# Patient Record
Sex: FEMALE | Race: WHITE
[De-identification: names, ages, dates, MRNs, and addresses within clinical notes are randomized per-mention and may not be internally consistent; named-entity substitution may affect disease eponyms.]

---

## 2017-01-11 ENCOUNTER — HOSPITAL ENCOUNTER (EMERGENCY)
Dept: HOSPITAL 59 - ER | Age: 62
Discharge: HOME | End: 2017-01-11
Payer: MEDICARE

## 2017-01-11 DIAGNOSIS — R19.7: ICD-10-CM

## 2017-01-11 DIAGNOSIS — R11.2: ICD-10-CM

## 2017-01-11 DIAGNOSIS — R10.11: Primary | ICD-10-CM

## 2017-01-11 LAB
ALBUMIN SERPL-MCNC: 4.3 GM/DL (ref 3.5–5)
ALP SERPL-CCNC: 53 U/L (ref 38–126)
ALT SERPL-CCNC: 49 U/L (ref 9–52)
ANION GAP SERPL CALC-SCNC: 16.5 MMOL/L (ref 7–16)
APPEARANCE UR: CLEAR
AST SERPL-CCNC: 23 U/L (ref 14–36)
BACTERIA #/AREA URNS HPF: (no result) /[HPF]
BASOPHILS NFR BLD: 0 % (ref 0–6)
BASOPHILS NFR BLD: 0.2 % (ref 0–6)
BILIRUB DIRECT SERPL-MCNC: 0 MG/DL (ref 0–0.3)
BILIRUB SERPL-MCNC: 0.8 MG/DL (ref 0.2–1.3)
BILIRUB UR-MCNC: NEGATIVE MG/DL
BUN SERPL-MCNC: 16 MG/DL (ref 7–17)
CO2 SERPL-SCNC: 19.5 MMOL/L (ref 22–30)
COLOR UR: YELLOW
CREAT SERPL-MCNC: 0.6 MG/DL (ref 0.52–1.04)
EOSINOPHIL NFR BLD: 0 % (ref 0–6)
EOSINOPHIL NFR BLD: 0.8 % (ref 0–6)
EPI CELLS #/AREA URNS HPF: (no result) /[HPF]
ERYTHROCYTE [DISTWIDTH] IN BLOOD BY AUTOMATED COUNT: 13 % (ref 11.5–14.5)
EST GLOMERULAR FILTRATION RATE: > 60 ML/MIN
GLUCOSE SERPL-MCNC: 96 MG/DL (ref 70–110)
GLUCOSE UR STRIP-MCNC: NEGATIVE MG/DL
HCT VFR BLD CALC: 41.8 % (ref 35–47)
HGB BLD-MCNC: 13.9 GM/DL (ref 11.6–16)
KETONES UR QL STRIP: NEGATIVE
LIPASE SERPL-CCNC: 72 U/L (ref 23–300)
LYMPHOCYTES NFR BLD AUTO: 4.9 % (ref 16–45)
LYMPHOCYTES NFR BLD: 5 % (ref 16–45)
MCH RBC QN AUTO: 30.3 PG (ref 27–33)
MCHC RBC AUTO-ENTMCNC: 33.3 G/DL (ref 32–36)
MCV RBC AUTO: 91.1 FL (ref 81–97)
MONOCYTES NFR BLD: 3 % (ref 0–9)
MONOCYTES NFR BLD: 4.6 % (ref 0–9)
NEUTROPHILS NFR BLD AUTO: 90 % (ref 47–80)
NEUTS BAND NFR BLD: 2 % (ref 0–5)
NITRITE UR QL STRIP: NEGATIVE
PLATELET # BLD: 220 K/UL (ref 130–400)
PMV BLD AUTO: 10.4 FL (ref 7.4–10.4)
PROT SERPL-MCNC: 7.1 GM/DL (ref 6.3–8.2)
PROT UR QL STRIP: NEGATIVE
RBC # BLD AUTO: 4.59 M/UL (ref 3.8–5.4)
RBC # UR STRIP: (no result) /UL
RBC #/AREA URNS HPF: (no result) /[HPF]
URINE LEUKOCYTE ESTERASE: NEGATIVE
UROBILINOGEN UR STRIP-ACNC: 0.2 E.U./DL (ref 0.2–1)
WBC # BLD AUTO: 10.5 K/UL (ref 4.2–12.2)
WBC #/AREA URNS HPF: (no result) /[HPF]

## 2017-01-11 PROCEDURE — 80048 BASIC METABOLIC PNL TOTAL CA: CPT

## 2017-01-11 PROCEDURE — 96361 HYDRATE IV INFUSION ADD-ON: CPT

## 2017-01-11 PROCEDURE — 96375 TX/PRO/DX INJ NEW DRUG ADDON: CPT

## 2017-01-11 PROCEDURE — 93005 ELECTROCARDIOGRAM TRACING: CPT

## 2017-01-11 PROCEDURE — 83690 ASSAY OF LIPASE: CPT

## 2017-01-11 PROCEDURE — 93010 ELECTROCARDIOGRAM REPORT: CPT

## 2017-01-11 PROCEDURE — 99284 EMERGENCY DEPT VISIT MOD MDM: CPT

## 2017-01-11 PROCEDURE — 96374 THER/PROPH/DIAG INJ IV PUSH: CPT

## 2017-01-11 PROCEDURE — 74176 CT ABD & PELVIS W/O CONTRAST: CPT

## 2017-01-11 PROCEDURE — 85027 COMPLETE CBC AUTOMATED: CPT

## 2017-01-11 PROCEDURE — 81001 URINALYSIS AUTO W/SCOPE: CPT

## 2017-01-11 PROCEDURE — 80076 HEPATIC FUNCTION PANEL: CPT

## 2017-01-11 NOTE — EMERGENCY DEPARTMENT RECORD
History of Present Illness





- General


Chief Complaint: Abdominal Pain


Stated Complaint: ABD PAIN,VOMITING


Time Seen by Provider: 17 19:15


Source: Patient


Mode of Arrival: Ambulatory


Limitations: No limitations





- History of Present Illness


Initial Comments: 


The patient is here due to a 5 hour hx of upper AP with nausea and single 

episode of vomiting and diarrhea. The pain is sharp and crampy and does radiate 

around to the R flank. She denies any CP, SOB, CARLOS, or any back pain. The 

patient states she has had a single episode of similar pain in the past and was 

told she had a gallbladder attack. Her only abdominal surgery was for a hiatal 

hernia.





MD Complaint: Abdominal pain


Onset/Timin


-: Hour(s)


Location: Epigastric


Radiation: None


Migration to: No migration


Severity: Mild


Quality: Other


Consistency: Constant


Improves With: Nothing


Worsens With: Nothing


Context: Other


Associated Symptoms: Diarrhea, Nausea, Vomiting





- Related Data


LMP (females 10-50): other


 Home Medications











 Medication  Instructions  Recorded  Confirmed  Last Taken


 


Omeprazole [Prilosec] 20 mg PO DAILY 17 Unknown








 Previous Rx's











 Medication  Instructions  Recorded


 


Ondansetron [Zofran Odt] 4 mg SL .Q4-6H PRN #12 tab.rapdis 17


 


Sucralfate [Carafate] 1 gm PO QID #28 tablet 17











 Allergies











Allergy/AdvReac Type Severity Reaction Status Date / Time


 


morphine AdvReac Severe severe Verified 17 19:47





   headache  


 


Penicillins AdvReac Mild mild Verified 17 19:47





   headache  














Travel Screening





- Travel/Exposure Within Last 30 Days


Have you traveled within the last 30 days?: No





- Travel/Exposure Within Last Year


Have you traveled outside the [place] in the last year?: No





- Additonal Travel Details


Have you been exposed to anyone with a communicable illness?: No





- Travel Symptoms


Symptom Screening: None





Review of Systems


Constitutional: Denies: Chills, Fever


Eyes: Denies: Eye discharge


ENT: Denies: Congestion


Respiratory: Denies: Cough, Dyspnea





Past Medical History





- SOCIAL HISTORY


Smoking Status: Former smoker


Alcohol Use: None


Drug Use: None





- RESPIRATORY


Hx Respiratory Disorders: No





- CARDIOVASCULAR


Hx Cardio Disorders: No





- NEURO


Hx Neuro Disorders: No





- GI


Hx GI Disorders: Yes


Comment:: colitis





- 


Hx Genitourinary Disorders: No





- ENDOCRINE


Hx Endocrine Disorders: No





- MUSCULOSKELETAL


Hx Arthritis: Yes


Comment:: psoriatic arthritis





- PSYCH


Hx Psych Problems: No





- HEMATOLOGY/ONCOLOGY


Hx Hematology/Oncology Disorders: No





Family Medical History


Any Significant Family History?: No





Physical Exam





- General


General Appearance: Alert, Oriented x3, Cooperative, No acute distress





- Head


Head exam: Atraumatic, Normocephalic, Normal inspection





- Eye


Eye exam: Normal appearance, PERRL





- Neck


Neck exam: Normal inspection, Full ROM.  negative: Tenderness





- Respiratory


Respiratory exam: Normal lung sounds bilaterally.  negative: Respiratory 

distress





- Cardiovascular


Cardiovascular Exam: Regular rate, Normal rhythm, Normal heart sounds





- GI/Abdominal


GI/Abdominal exam: Soft, Tenderness (There is mild to moderate RUQ and 

epigastric abdominal tenderness.).  negative: Guarding, Rebound, Rigid





- Extremities


Extremities exam: Normal inspection, Full ROM, Normal capillary refill.  

negative: Tenderness





- Neurological


Neurological exam: Normal gait.  negative: Abnormal gait





Course





 Vital Signs











  17





  19:09


 


Temperature 98.6 F


 


Pulse Rate 109 H


 


Respiratory 24





Rate 


 


Blood Pressure 116/82


 


Pulse Ox 97














- Reevaluation(s)


Reevaluation #1: 


The patient is doing much better at this time. She denies any pain or 

discomfort and on exam has very mild epigastric tenderness. She feels much 

better and is resting comfortably. I did explain the lab and CT results with 

the patient and the need for F/U.





17 20:34





Reevaluation #2: 


The patient is doing much better at this time. She feels 100% improved and 

denies any pain or discomfort. On exam her abdomen is very soft and nontender 

in all 4 quads. I explained the lab results to the patient and did explain the 

need for the US. The patient is to return to the ER at 7:15 am for a recheck 

and for an US at 8am.





17 20:58








Medical Decision Making





- Data Complexity


MDM Data: Labs Ordered and/or Reviewed, X-Ray Ordered and/or Reviewed, EKG 

Ordered and/or Reviewed





- Lab Data


Result diagrams: 


 17 19:51





 17 19:51





- EKG Data


-: EKG Interpreted by Me


EKG: No Acute Changes, Normal EKG





- Radiology Data


Radiology results: Report reviewed (ABD CT; No acute changes.)





Disposition


Disposition: Discharge


Clinical Impression: 


Abdominal pain


Qualifiers:


 Abdominal location: right upper quadrant Qualified Code(s): R10.11 - Right 

upper quadrant pain


Disposition: Home, Self-Care


Condition: (1) Good


Instructions:  Abdominal Pain (ED)


Additional Instructions: 


Please only eat clear liquids tonight and nothing past midnight. Please use the 

Zofran and Carafate as directed. Please return to the ER at 7:15 am for recheck 

and for an 8am US.


Prescriptions: 


Sucralfate [Carafate] 1 gm PO QID #28 tablet


Ondansetron [Zofran Odt] 4 mg SL .Q4-6H PRN #12 tab.rapdis


 PRN Reason: Nausea


Forms:  Patient Portal Access


Time of Disposition: 21:01

## 2017-01-13 NOTE — CT SCAN REPORT
EXAM:  ABDOMEN AND PELVIS CT WITHOUT IV CONTRAST 



HISTORY:  ACUTE RIGHT UPPER QUADRANT ABDOMINAL PAIN RADIATING TO THE POSTERIOR 
RIGHT FLANK.  



TECHNIQUE:  Contiguous axial images from the lung bases to the symphysis pubis 
were obtained without IV contrast.  



Comparison:  Abdomen ultrasound 3/11/13.  



FINDINGS:  The lung bases are clear.  Small to moderate sized hiatal hernia.  



Evaluation of the solid abdominal visceral organs is compromised due to lack of 
IV contrast, however, the liver, spleen, adrenals, pancreas, and gallbladder 
are normal.  No renal calculi, hydronephrosis, or perinephric fat stranding.  
The urinary bladder is decompressed with no adjacent fat stranding.  



The visualized loops of small and large bowel are of normal caliber with no 
bowel wall thickening.  Normal appendix.                



No free intraperitoneal fluid or adenopathy.  Mild aortic calcification.  No 
lytic or blastic osseous lesion.  



IMPRESSION:  

1.  NO ACUTE PROCESS OF THE ABDOMEN OR PELVIS.  



2.  SMALL TO MODERATE SIZED HIATAL HERNIA.  



3.  MILD AORTIC CALCIFICATION.  



JOB NUMBER:  [id number]
[facility]

## 2018-01-09 ENCOUNTER — HOSPITAL ENCOUNTER (EMERGENCY)
Dept: HOSPITAL 59 - ER | Age: 63
Discharge: HOME | End: 2018-01-09
Payer: MEDICARE
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
## 2018-01-09 DIAGNOSIS — S61.412A: Primary | ICD-10-CM
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
## 2018-01-09 PROCEDURE — 12001 RPR S/N/AX/GEN/TRNK 2.5CM/<: CPT

## 2018-01-09 PROCEDURE — 99283 EMERGENCY DEPT VISIT LOW MDM: CPT

## 2018-01-09 PROCEDURE — 96372 THER/PROPH/DIAG INJ SC/IM: CPT

## 2018-01-09 PROCEDURE — 90715 TDAP VACCINE 7 YRS/> IM: CPT

## 2018-01-09 NOTE — EMERGENCY DEPARTMENT RECORD
History of Present Illness





- General


Chief Complaint: Animal Bite


Stated Complaint: DOG BITE


Time Seen by Provider: 18 10:58


Source: Patient


Mode of Arrival: Ambulatory


Limitations: No limitations





- History of Present Illness


Initial Comments: 


The patient was bit over the dorsal L hand an hour ago by one of her dogs. The 

dog's rabies shots are UTD. She denies any numbness, tingling or weakness and 

her Td is probably NOT UTD. She also states she is not allergic to PCN and can 

take Amox with no problems.





MD Complaint: Animal bite


Onset/Timin


-: Hour(s)


Animal: Dog


Description: Household pet


Mechanism: Bite


Pain Description: Sharp


Severity scale (1-10): 6


Context: Animals fighting, Playing with animal, Unprovoked


Associated Symptoms: Bleeding


Treatments Prior to Arrival: Pressure





- Related Data


Patient Tetanus UTD (within 5 yrs): No


 Home Medications











 Medication  Instructions  Recorded  Confirmed  Last Taken


 


Adalimumab [Humira] 20 mg SQ WEEKLY 18 Unknown


 


Aspirin [Aspir-Low] 81 mg PO DAILY 18 Unknown


 


Hydrocodone/Acetaminophen [Norco 1 tab PO Q6H PRN 18 10:

00





10mg/325mg]    








 Previous Rx's











 Medication  Instructions  Recorded


 


Amoxicillin/Potassium Clav 1 tab PO BID #14 tablet 18





[Augmentin 875Mg/125Mg]  











 Allergies











Allergy/AdvReac Type Severity Reaction Status Date / Time


 


morphine AdvReac Severe severe Verified 17 19:47





   headache  


 


Penicillins AdvReac Mild mild Verified 17 19:47





   headache  














Travel Screening





- Travel/Exposure Within Last 30 Days


Have you traveled within the last 30 days?: No





Review of Systems


Constitutional: Denies: Chills, Fever





Past Medical History





- SOCIAL HISTORY


Smoking Status: Former smoker


Alcohol Use: None


Drug Use: None





- RESPIRATORY


Hx Respiratory Disorders: No





- CARDIOVASCULAR


Hx Cardio Disorders: No





- NEURO


Hx Neuro Disorders: No





- GI


Hx GI Disorders: Yes


Comment:: colitis





- 


Hx Genitourinary Disorders: No





- ENDOCRINE


Hx Endocrine Disorders: No





- MUSCULOSKELETAL


Hx Musculoskeletal Disorders: Yes


Hx Arthritis: Yes


Comment:: psoriatic arthritis





- PSYCH


Hx Psych Problems: No





- HEMATOLOGY/ONCOLOGY


Hx Hematology/Oncology Disorders: No





Family Medical History


Any Significant Family History?: No





Physical Exam





- General


General Appearance: Alert, Cooperative, No acute distress





- Head


Head exam: Atraumatic, Normocephalic, Normal inspection





- Eye


Eye exam: Normal appearance, PERRL





- Extremities


Extremities exam: Full ROM (There is normal tendon function with full hand and 

finger extension all 4 fingers.), Normal capillary refill.  negative: Normal 

inspection (There is a 2.2 cm lac to the mid dorsal L hand. There is very mild 

tenderness surrounding the wound. )


Image of Hand: 


  __________________________














  __________________________





 1 - 2.2 cm lac.








Course





 Vital Signs











  18





  10:43


 


Temperature 98.1 F


 


Pulse Rate 74


 


Respiratory 16





Rate 


 


Blood Pressure 135/87


 


Pulse Ox 97














- Reevaluation(s)


Reevaluation #1: 


Procedure note: The L hand was anesth. with 1.5 cc's Lido 1% with Epi. The 

wound was prepped with betadine and cleansed with sterile saline. The wound was 

explored and did not involve the tendon. The wound then was loosely closed with 

3 4.0 nylon sutures.


18 11:30








Disposition


Disposition: Discharge


Clinical Impression: 


Hand laceration


Qualifiers:


 Encounter type: initial encounter Foreign body presence: without foreign body 

Laterality: left Qualified Code(s): S61.412A - Laceration without foreign body 

of left hand, initial encounter





Disposition: Home, Self-Care


Condition: (2) Stable


Instructions:  Animal Bite (ED)


Additional Instructions: 


Please keep the hand dry for 2 days and take your home pain medicines. Please 

take Augmentin as directed. Watch for signs of infection and have the sutures 

removed in 10 days.


Prescriptions: 


Amoxicillin/Potassium Clav [Augmentin 875Mg/125Mg] 1 tab PO BID #14 tablet


Forms:  Patient Portal Access


Time of Disposition: 11:33





Quality





- Quality Measures


Quality Measures: N/A





- Blood Pressure Screening


View Details: Yes


Does Patient Have Any of the Following: No


Blood Pressure Classification: Pre-Hypertensive BP Reading


Systolic Measurement: 135


Diastolic Measurement: 87


Screening for High Blood Pressure: < Pre-Hypertensive BP, F/U Documented > [

]


Pre-Hypertensive Follow-up Interventions: Referral to alternative/primary care 

provider.

## 2018-01-18 ENCOUNTER — HOSPITAL ENCOUNTER (EMERGENCY)
Dept: HOSPITAL 59 - ER | Age: 63
Discharge: HOME | End: 2018-01-18
Payer: MEDICARE

## 2018-01-18 DIAGNOSIS — Z48.02: Primary | ICD-10-CM

## 2018-01-18 NOTE — EMERGENCY DEPARTMENT RECORD
History of Present Illness





- General


Chief Complaint: Suture removal


Stated Complaint: STICH REMOVAL


Time Seen by Provider: 01/18/18 14:08


Source: Patient


Mode of arrival: Ambulatory


Limitations: No limitations





- History of Present Illness


Initial Comments: 


The patient is here for suture removal. She denies any new problems or issues.





MD Complaint: Suture/staple removal





- Related Data


 Previous Rx's











 Medication  Instructions  Recorded


 


Amoxicillin/Potassium Clav 1 tab PO BID #14 tablet 01/09/18





[Augmentin 875Mg/125Mg]  











 Allergies











Allergy/AdvReac Type Severity Reaction Status Date / Time


 


morphine AdvReac Severe severe Verified 01/11/17 19:47





   headache  


 


Penicillins AdvReac Mild mild Verified 01/11/17 19:47





   headache  














Past Medical History





- SOCIAL HISTORY


Smoking Status: Former smoker


Drug Use: None





- RESPIRATORY


Hx Respiratory Disorders: No





- CARDIOVASCULAR


Hx Cardio Disorders: No





- NEURO


Hx Neuro Disorders: No





- GI


Hx GI Disorders: Yes


Comment:: colitis





- 


Hx Genitourinary Disorders: No





- ENDOCRINE


Hx Endocrine Disorders: No





- MUSCULOSKELETAL


Hx Musculoskeletal Disorders: Yes


Hx Arthritis: Yes


Comment:: psoriatic arthritis





- PSYCH


Hx Psych Problems: No





- HEMATOLOGY/ONCOLOGY


Hx Hematology/Oncology Disorders: No





Physical Exam





- General


General Appearance: Alert, Oriented x3, Cooperative, No acute distress





- Extremities


Extremities exam: Full ROM.  negative: Normal inspection (The dog bite lac is 

very well healed. 3 sutures were removed with no difficulty.), Tenderness





Disposition


Disposition: Discharge


Clinical Impression: 


 Visit for suture removal





Disposition: Home, Self-Care


Condition: (2) Stable


Instructions:  Stitches Removal (ED)


Additional Instructions: 


Return to the ER for any problems.


Forms:  Patient Portal Access


Time of Disposition: 14:14





Quality





- Quality Measures


Quality Measures: N/A





- Blood Pressure Screening


View Details: Yes


Does Patient Have Any of the Following: No


Blood Pressure Classification: Normal BP Reading


Systolic Measurement: 111


Diastolic Measurement: 78


Screening for High Blood Pressure: < Normal BP, F/U Not Required > []

## 2018-02-05 ENCOUNTER — HOSPITAL ENCOUNTER (OUTPATIENT)
Dept: HOSPITAL 59 - HOP | Age: 63
Discharge: HOME | End: 2018-02-05
Attending: INTERNAL MEDICINE
Payer: MEDICARE

## 2018-02-05 DIAGNOSIS — K29.70: ICD-10-CM

## 2018-02-05 DIAGNOSIS — R12: ICD-10-CM

## 2018-02-05 DIAGNOSIS — K44.9: ICD-10-CM

## 2018-02-05 DIAGNOSIS — R10.13: Primary | ICD-10-CM

## 2018-02-05 DIAGNOSIS — M19.90: ICD-10-CM

## 2018-02-07 NOTE — OPERATIVE NOTE
DATE OF SURGERY:   02/05/2018 



Surgeon:  Artemio Schmidt DO 



Referring physician:   Dr. Brooke Charles



OPERATION:  ESOPHAGOGASTRODUODENOSCOPY WITH MULTIPLE BIOPSIES



INDICATION:   Recent episode of "intestinal virus" per patient.  She had excessive vomiting and 
since that time has experienced epigastric pain and some mild pyrosis.  



She had a hiatal hernia in the past, but had fundoplication completed in 2011 by Dr. Kumar and had 
done well, up until recently.  Upper endoscopy was performed at this time for further evaluation.  



Anesthesia:  Intravenous sedation was administered by the Department of Anesthesiology and included 
Diprivan titrated to effect.  



PROCEDURE:  Following informed consent from this alert individual, including a discussion of the 
risks and benefits of the procedure and opportunity for the patient to ask questions, the patient 
was in the left lateral decubitus position.  The Olympus  video endoscope was inserted in the 
esophagus without resistance.  The proximal esophagus had a normal appearance with normal folds and 
distensibility.  The mid-esophagus likewise was free from mucosal changes.  The squamocolumnar 
junction was well defined and it appeared unremarkable.  However, there was a 2 cm hiatal hernia 
noted extending through the previous fundoplication.  Within the hernia sac there was some retained 
suture noted.  



The subdiaphragmatic stomach was entered and found to have erythema with erosive changes in the 
antrum.  The pylorus is patent, duodenal bulb, cecum, and descending duodenum were then examined in 
a serial fashion and found to be normal.  



The endoscope was then drawn back into the body of the stomach, retroflexion accomplished following 
air insufflation again revealed a fundoplication, however, there was the small 2 to 2.5 cm hiatal 
hernia noted above the wrap.  



The endoscope was then straightened.  Biopsies were taken from the stomach to assess for 
helicobacter pylori and check histology. 



The endoscope was then withdrawn back through a normal appearing esophagus and removed from the 
patient.  



She tolerated the procedure well and was returned to the recovery area in stable condition.  



IMPRESSION:  

1.  Previous fundoplication with hiatal hernia extending through the wrap for approximately 2 to 2.5 
cm.  There was some retained suture within the hernia sac itself. 

2.  Mildly erosive antral gastritis.  Biopsies from the stomach were obtained. 



RECOMMENDATIONS:  The patient could be suffering with post viral gastropathy, however, in light of 
the inflammation noted in her stomach, I will start her on omeprazole 20 mg each morning _____ the 
patient may be forthcoming pending results of the biopsy obtained today.  



There is also perhaps an option of redoing the fundoplication, but I am not sure that would be of 
significant clinical benefit at this point.  If she does not respond to omeprazole, she can follow 
up with Dr. Kumar as well.  



I would like her to follow up in the GI Clinic in the next 2 to 3 months.  



As always, thank you for allowing me to participate in the care of your patient. CC: Dr. Dudley Schmidt, DO [facility]

## 2018-05-26 ENCOUNTER — HOSPITAL ENCOUNTER (EMERGENCY)
Dept: HOSPITAL 59 - ER | Age: 63
Discharge: HOME | End: 2018-05-26
Payer: MEDICARE

## 2018-05-26 DIAGNOSIS — K08.89: Primary | ICD-10-CM

## 2018-05-26 PROCEDURE — 99282 EMERGENCY DEPT VISIT SF MDM: CPT

## 2018-05-26 NOTE — EMERGENCY DEPARTMENT RECORD
History of Present Illness





- General


Chief complaint: Dental


Stated complaint: DENTAL PAIN


Time Seen by Provider: 18 20:48


Source: Patient


Mode of Arrival: Ambulatory





- History of Present Illness


Initial comments: 





Tooth pain began in the middle of last night in her left upper molar. No known 

trauma, but she thinks she grinds her teeth and it may have cracked.  No fevers

, chills, swelling  or drainage. She has psoriatic arthritis for which she 

takes norco but it hasn't helped her tonight.


Onset/Timin


-: Days(s)


Severity: Mild





- Related Data


 Previous Rx's











 Medication  Instructions  Recorded


 


Hydrocodone/APAP 5/325Mg [Norco 1 each PO Q6H #7 tab 18





5Mg/325Mg]  


 


Penicillin V Potassium 500 mg PO QID #39 tablet 18











 Allergies











Allergy/AdvReac Type Severity Reaction Status Date / Time


 


morphine AdvReac Severe severe Verified 17 19:47





   headache  


 


Penicillins AdvReac Mild mild Verified 17 19:47





   headache  














Travel Screening





- Travel/Exposure Within Last 30 Days


Have you traveled within the last 30 days?: No





- Travel/Exposure Within Last Year


Have you traveled outside the [place] in the last year?: No





- Additonal Travel Details


Have you been exposed to anyone with a communicable illness?: No





- Travel Symptoms


Symptom Screening: None





Review of Systems


Reviewed: No additional complaints except as noted below


Constitutional: Reports: As per HPI.  Denies: Chills, Fever, Malaise, Night 

sweats, Weakness, Weight change


Eyes: Reports: As per HPI.  Denies: Eye discharge, Eye pain, Photophobia, 

Vision change


ENT: Reports: As per HPI.  Denies: Congestion, Dental pain, Ear pain, Epistaxis

, Hearing loss, Throat pain


Respiratory: Reports: As per HPI.  Denies: Cough, Dyspnea, Hemoptysis, Stridor, 

Wheezes


Cardiovascular: Reports: As per HPI.  Denies: Arrhythmia, Chest pain, Dyspnea 

on exertion, Edema, Murmurs, Orthopnea, Palpitations, Paroxysmal nocturnal 

dyspnea, Rheumatic Fever, Syncope


Endocrine: Reports: As per HPI.  Denies: Fatigue, Heat or cold intolerance, 

Polydipsia, Polyuria


Gastrointestinal: Reports: As per HPI.  Denies: Abdominal pain, Constipation, 

Diarrhea, Hematemesis, Hematochezia, Melena, Nausea, Vomiting


Genitourinary: Reports: As per HPI.  Denies: Abnormal menses, Discharge, 

Dyspareunia, Dysuria, Frequency, Hematuria, Incontinence, Retention, Urgency


Musculoskeletal: Reports: As per HPI.  Denies: Arthralgia, Back pain, Gout, 

Joint swelling, Myalgia, Neck pain


Skin: Reports: As per HPI.  Denies: Bruising, Change in color, Change in hair/

nails, Lesions, Pruritus, Rash


Neurological: Reports: As per HPI.  Denies: Abnormal gait, Confusion, Headache, 

Numbness, Paresthesias, Seizure, Tingling, Tremors, Vertigo, Weakness


Psychiatric: Reports: As per HPI.  Denies: Anxiety, Auditory hallucinations, 

Depression, Homicidal thoughts, Suicidal thoughts, Visual hallucinations


Hematological/Lymphatic: Reports: As per HPI.  Denies: Anemia, Blood Clots, 

Easy bleeding, Easy bruising, Swollen glands





Past Medical History





- SOCIAL HISTORY


Smoking Status: Former smoker


Alcohol Use: None


Drug Use: None





- RESPIRATORY


Hx Respiratory Disorders: No





- CARDIOVASCULAR


Hx Cardio Disorders: Yes


Hx Irregular Heartbeat: Yes (AFib r/t medications)





- NEURO


Hx Neuro Disorders: No





- GI


Hx GI Disorders: Yes


Hx Abdominal Pain: Yes (upper gastric)


Hx Reflux: Yes


Hx Hiatal Hernia: Yes


Hx Nausea/Vomiting: Yes


Comment:: colitis





- 


Hx Genitourinary Disorders: No





- ENDOCRINE


Hx Endocrine Disorders: No





- MUSCULOSKELETAL


Hx Musculoskeletal Disorders: Yes


Hx Arthritis: Yes


Comment:: psoriatic arthritis





- PSYCH


Hx Psych Problems: No





- HEMATOLOGY/ONCOLOGY


Hx Hematology/Oncology Disorders: No





Family Medical History


Any Significant Family History?: No





Physical Exam





- General


General Appearance: Alert, Oriented x3, Cooperative, No acute distress





- Head


Head exam: Normal inspection





- Eye


Eye exam: Normal appearance, PERRL, EOMI


Pupils: Normal accommodation





- ENT


ENT exam: Normal exam, Mucous membranes moist, Normal external ear exam, Normal 

orophraynx, TM's normal bilaterally


Ear exam: Normal external inspection.  negative: External canal tenderness


Nasal Exam: Normal inspection.  negative: Discharge, Sinus tenderness


Mouth exam: Normal external inspection, Tongue normal


Teeth exam: Normal inspection, Dental tenderness # (#16 tender to light 

palpation).  negative: Dental caries


Throat exam: Normal inspection.  negative: Tonsillar erythema, Tonsillar exudate





- Neck


Neck exam: Normal inspection, Full ROM.  negative: Lymphadenopathy, Meningismus

, Tenderness





- Respiratory


Respiratory exam: Normal lung sounds bilaterally.  negative: Respiratory 

distress





- Cardiovascular


Cardiovascular Exam: Regular rate, Normal rhythm, Normal heart sounds





- GI/Abdominal


GI/Abdominal exam: Soft, Normal bowel sounds.  negative: Tenderness





- Rectal


Rectal exam: Deferred





- 


 exam: Deferred





- Extremities


Extremities exam: Normal inspection, Full ROM, Normal capillary refill.  

negative: Tenderness





- Back


Back exam: Reports: Normal inspection, Full ROM.  Denies: Muscle spasm, Rash 

noted, Tenderness





- Neurological


Neurological exam: Alert, Normal gait, Oriented X3, Reflexes normal





- Psychiatric


Psychiatric exam: Normal affect, Normal mood





- Skin


Skin exam: Dry, Intact, Normal color, Warm





Course





 Vital Signs











  18





  20:36


 


Temperature 98.1 F


 


Pulse Rate 71


 


Respiratory 20





Rate 


 


Blood Pressure 139/79


 


Pulse Ox 98














Medical Decision Making





- Management Options


MDM Management: No Additional Work-up Planned (dental follow up next week.)





Disposition


Disposition: Discharge


Clinical Impression: 


 Toothache





Disposition: Home, Self-Care


Condition: (1) Good


Instructions:  Dental Abscess (ED), Toothache (ED)


Additional Instructions: 


Take Pen vk as directed until gone.


Take norco as needed as directed for severe  pain.


Follow up with your dentist next week.


Soft diet.


Prescriptions: 


Hydrocodone/APAP 5/325Mg [Norco 5Mg/325Mg] 1 each PO Q6H #7 tab


Penicillin V Potassium 500 mg PO QID #39 tablet





Quality





- Quality Measures


Quality Measures: N/A





- Blood Pressure Screening


Does Patient Have Any of the Following: No


Blood Pressure Classification: Pre-Hypertensive BP Reading


Systolic Measurement: 139


Diastolic Measurement: 79


Screening for High Blood Pressure: < Pre-Hypertensive BP, F/U Documented > [

]


Pre-Hypertensive Follow-up Interventions: Follow-up with rescreen every year.

## 2018-09-20 ENCOUNTER — HOSPITAL ENCOUNTER (OUTPATIENT)
Dept: HOSPITAL 59 - HOP | Age: 63
Discharge: HOME | End: 2018-09-20
Attending: INTERNAL MEDICINE
Payer: MEDICARE

## 2018-09-20 DIAGNOSIS — D12.3: ICD-10-CM

## 2018-09-20 DIAGNOSIS — Z12.11: Primary | ICD-10-CM

## 2018-09-20 DIAGNOSIS — K57.30: ICD-10-CM

## 2018-09-20 DIAGNOSIS — M19.90: ICD-10-CM

## 2018-09-25 NOTE — OPERATIVE NOTE
DATE OF SURGERY: 



OPERATION: COLONOSCOPY with cold snare polypectomy. 



PREOPERATIVE DIAGNOSIS: Colorectal cancer screening, average risk. 



POSTOPERATIVE DIAGNOSES: 

1. Transverse colon polyp. 

2. Ascending colon diverticulum. 



ESTIMATED BLOOD LOSS: Minimum. 



PREPARATION QUALITY: Excellent. 



COMPLICATIONS: None apparent. 



PROCEDURE: After informed consent was obtained from the patient, she was placed in the left lateral 
decubitus position in the endoscopy suite, sedated and monitored by the department of anesthesia. 
Digital rectal exam was unremarkable. A well-lubricated [id number] colonoscope was inserted into the 
rectum and advanced to the cecum. Preparation quality was good to excellent. The cecum, cecal bulb, 
and ascending colon were unremarkable. The transverse colon revealed a semi-pedunculated polyp 
approximately 5-6 mm in diameter removed with a cold snare. Minimal bleeding was noted at the site. 
The remainder of the transverse, descending colon, sigmoid colon, and rectum were otherwise 
unremarkable. There was a solitary diverticulum that was seen in the ascending colon, however. 
J-turn views of the anorectum were unremarkable. The endoscope was straightened, the rectal ampulla 
deflated, and the endoscope was removed. 



RECOMMENDATIONS: The patient should resume her medications and diet. She will require repeat exam in 
3-5 years pending tissue histology. 



As always, thank you for allowing me to participate in the healthcare of your patients. CC: Dr. Brooke MEDEL